# Patient Record
Sex: FEMALE | Race: WHITE | NOT HISPANIC OR LATINO | Employment: FULL TIME | URBAN - NONMETROPOLITAN AREA
[De-identification: names, ages, dates, MRNs, and addresses within clinical notes are randomized per-mention and may not be internally consistent; named-entity substitution may affect disease eponyms.]

---

## 2024-05-13 ENCOUNTER — HOSPITAL ENCOUNTER (EMERGENCY)
Facility: HOSPITAL | Age: 44
Discharge: HOME | End: 2024-05-13
Payer: COMMERCIAL

## 2024-05-13 VITALS
HEART RATE: 79 BPM | RESPIRATION RATE: 16 BRPM | TEMPERATURE: 97.5 F | BODY MASS INDEX: 18.88 KG/M2 | HEIGHT: 61 IN | WEIGHT: 100 LBS | DIASTOLIC BLOOD PRESSURE: 67 MMHG | OXYGEN SATURATION: 100 % | SYSTOLIC BLOOD PRESSURE: 107 MMHG

## 2024-05-13 DIAGNOSIS — K08.89 PAIN, DENTAL: Primary | ICD-10-CM

## 2024-05-13 PROCEDURE — 64400 NJX AA&/STRD TRIGEMINAL NRV: CPT | Performed by: PHYSICIAN ASSISTANT

## 2024-05-13 PROCEDURE — 99284 EMERGENCY DEPT VISIT MOD MDM: CPT | Mod: 25

## 2024-05-13 RX ORDER — BUPIVACAINE HYDROCHLORIDE AND EPINEPHRINE 5; 5 MG/ML; UG/ML
10 INJECTION, SOLUTION EPIDURAL; INTRACAUDAL; PERINEURAL ONCE
Status: DISCONTINUED | OUTPATIENT
Start: 2024-05-13 | End: 2024-05-13 | Stop reason: HOSPADM

## 2024-05-13 RX ORDER — HYDROCODONE BITARTRATE AND ACETAMINOPHEN 5; 325 MG/1; MG/1
1 TABLET ORAL EVERY 6 HOURS PRN
Qty: 12 TABLET | Refills: 0 | Status: SHIPPED | OUTPATIENT
Start: 2024-05-13 | End: 2024-05-16

## 2024-05-13 RX ORDER — AMOXICILLIN 500 MG/1
500 CAPSULE ORAL 3 TIMES DAILY
Qty: 30 CAPSULE | Refills: 0 | Status: SHIPPED | OUTPATIENT
Start: 2024-05-13 | End: 2024-05-23

## 2024-05-13 ASSESSMENT — COLUMBIA-SUICIDE SEVERITY RATING SCALE - C-SSRS
1. IN THE PAST MONTH, HAVE YOU WISHED YOU WERE DEAD OR WISHED YOU COULD GO TO SLEEP AND NOT WAKE UP?: NO
2. HAVE YOU ACTUALLY HAD ANY THOUGHTS OF KILLING YOURSELF?: NO
6. HAVE YOU EVER DONE ANYTHING, STARTED TO DO ANYTHING, OR PREPARED TO DO ANYTHING TO END YOUR LIFE?: NO

## 2024-05-13 ASSESSMENT — PAIN DESCRIPTION - LOCATION: LOCATION: TEETH

## 2024-05-13 ASSESSMENT — PAIN DESCRIPTION - ORIENTATION: ORIENTATION: LEFT;UPPER

## 2024-05-13 ASSESSMENT — PAIN - FUNCTIONAL ASSESSMENT: PAIN_FUNCTIONAL_ASSESSMENT: 0-10

## 2024-05-13 ASSESSMENT — PAIN DESCRIPTION - PAIN TYPE: TYPE: ACUTE PAIN

## 2024-05-13 ASSESSMENT — PAIN SCALES - GENERAL: PAINLEVEL_OUTOF10: 10 - WORST POSSIBLE PAIN

## 2024-05-17 NOTE — ED PROVIDER NOTES
HPI   Chief Complaint   Patient presents with    Dental Pain     Upper left dental pain for 2 days, had an appointment today with the dentist and the dentist cancelled it. Is having a lot of pain.        History of present illness:  43-year-old female presents the emergency room for complaints of dental pain.  The patient states that she began having pain in her left upper teeth beginning close to a couple weeks ago.  She states the pain has been gradual getting worse and she is concerned that is infected now.  She states she is scheduled to be seen by her dentist in 2 days to have some teeth removed.  She states she does not currently take any daily medications.  She denies any other symptoms time including any difficulty swallowing difficulty breathing any swelling in her neck or hoarseness.  She denies any other symptoms at this time.    Social history: Negative for alcohol and drug use.    Review of systems:   Gen.: No weight loss, fatigue, anorexia, insomnia, fever.   Eyes: No vision loss, double vision  ENT: No pharyngitis, neck pain, headache  Cardiac: No chest pain, palpitations, syncope  Pulmonary: No shortness of breath, cough, hemoptysis.   Heme/lymph: No swollen glands, fever, bleeding.   GI: No abdominal pain, change in bowel habits, melena, hematemesis, hematochezia, nausea, vomiting, diarrhea.   : No discharge, dysuria, frequency, urgency, hematuria.   Musculoskeletal: No limb pain, joint pain, joint swelling.   Skin: No rashes.   Review of systems is otherwise negative unless stated above or in history of present illness.      Physical exam:  General: Vitals noted, no distress. Afebrile.   EENT: No lymphadenopathy appreciated, poor dentition throughout the mouth with some teeth that are rotted to the gumline with exposed pulp on the left upper premolars and incisor, no other acute findings  Cardiac: Regular, rate, rhythm, no murmur.   Pulmonary: Lungs clear bilaterally with good aeration. No  adventitious breath sounds.   Abdomen: Soft, nonsurgical. Nontender. No peritoneal signs. Normoactive bowel sounds.   Extremities: No peripheral edema.   Skin: No rash.   Neuro: No focal neurologic deficits      Medical decision making:   Plan: Home-going.  Discussed differential. Will follow-up with the primary physician in the next 2-3 days. Return if worse. They understand return precautions and discharge instructions. Patient and family/friend/caregiver are in agreement with this plan. 43-year-old female presents the emergency room for complaints of dental pain.  The patient states that she began having pain in her left upper teeth beginning close to a couple weeks ago.  She states the pain has been gradual getting worse and she is concerned that is infected now.  She states she is scheduled to be seen by her dentist in 2 days to have some teeth removed.  She states she does not currently take any daily medications.  She denies any other symptoms time including any difficulty swallowing difficulty breathing any swelling in her neck or hoarseness.  She denies any other symptoms at this time. EENT: No lymphadenopathy appreciated, poor dentition throughout the mouth with some teeth that are rotted to the gumline with exposed pulp on the left upper premolars and incisor, no other acute findings.  A dental block was attempted at this time with minimal success, patient was given p.o. antibiotics at this time and encouraged her to follow-up with her primary care doctor.  Explained to her I would be sending her home with antibiotics and pain medications at this time.  Impression:   1.  Dental infection            History provided by:  Patient   used: No                        No data recorded                   Patient History   Past Medical History:   Diagnosis Date    Personal history of other complications of pregnancy, childbirth and the puerperium     History of miscarriage    Varicella without  complication     Varicella without complication     Past Surgical History:   Procedure Laterality Date     SECTION, CLASSIC  02/10/2017     Section    CT ANGIO NECK  2023    CT NECK ANGIO W AND WO IV CONTRAST 2023 CON CT    MOUTH SURGERY  2017    Oral Surgery Tooth Extraction    TUBAL LIGATION  02/10/2017    Tubal Ligation     No family history on file.  Social History     Tobacco Use    Smoking status: Not on file    Smokeless tobacco: Not on file   Substance Use Topics    Alcohol use: Not on file    Drug use: Not on file       Physical Exam   ED Triage Vitals   Temperature Heart Rate Respirations BP   24 1528 24 1528 24 1528 24 1528   36.7 °C (98.1 °F) 89 16 123/82      Pulse Ox Temp Source Heart Rate Source Patient Position   24 1528 24 1706 24 1528 24 1528   99 % Oral Monitor Sitting      BP Location FiO2 (%)     24 1528 --     Left arm        Physical Exam    ED Course & MDM   Diagnoses as of 24 1752   Pain, dental       Medical Decision Making      Procedure  Procedures     Deangelo Espinoza PA-C  24 1757

## 2024-05-17 NOTE — ED PROCEDURE NOTE
Procedure  Dental Block    Performed by: Deangelo Espinoza PA-C  Authorized by: Deangelo Espinoza PA-C    Consent:     Consent obtained:  Verbal    Consent given by:  Patient    Risks, benefits, and alternatives were discussed: yes      Risks discussed:  Pain and unsuccessful block    Alternatives discussed:  No treatment  Indications:     Indications: dental abscess and dental pain    Location:     Block type:  Nasopalatine  Procedure details:     Syringe type:  Luer lock syringe    Needle gauge:  27 G    Anesthetic injected:  Bupivacaine 0.5% WITH epi    Injection procedure:  Anatomic landmarks identified, anatomic landmarks palpated, introduced needle, negative aspiration for blood and incremental injection  Post-procedure details:     Outcome:  Pain unchanged    Procedure completion:  Tolerated               Deangelo Espinoza PA-C  05/17/24 2989